# Patient Record
Sex: FEMALE | URBAN - METROPOLITAN AREA
[De-identification: names, ages, dates, MRNs, and addresses within clinical notes are randomized per-mention and may not be internally consistent; named-entity substitution may affect disease eponyms.]

---

## 2021-07-09 ENCOUNTER — APPOINTMENT (RX ONLY)
Dept: URBAN - METROPOLITAN AREA CLINIC 312 | Facility: CLINIC | Age: 66
Setting detail: DERMATOLOGY
End: 2021-07-09

## 2021-07-09 DIAGNOSIS — L74.51 PRIMARY FOCAL HYPERHIDROSIS: ICD-10-CM | Status: INADEQUATELY CONTROLLED

## 2021-07-09 DIAGNOSIS — L40.3 PUSTULOSIS PALMARIS ET PLANTARIS: ICD-10-CM | Status: INADEQUATELY CONTROLLED

## 2021-07-09 PROBLEM — L74.519 PRIMARY FOCAL HYPERHIDROSIS, UNSPECIFIED: Status: ACTIVE | Noted: 2021-07-09

## 2021-07-09 PROCEDURE — 99204 OFFICE O/P NEW MOD 45 MIN: CPT

## 2021-07-09 PROCEDURE — ? COUNSELING

## 2021-07-09 PROCEDURE — ? MEDICATION COUNSELING

## 2021-07-09 PROCEDURE — ? PRESCRIPTION

## 2021-07-09 PROCEDURE — ? PRESCRIPTION MEDICATION MANAGEMENT

## 2021-07-09 RX ORDER — GLYCOPYRROLATE 1 MG/1
TABLET ORAL
Qty: 30 | Refills: 11 | Status: ERX | COMMUNITY
Start: 2021-07-09

## 2021-07-09 RX ORDER — HALOBETASOL PROPIONATE 0.5 MG/G
OINTMENT TOPICAL
Qty: 1 | Refills: 1 | Status: ERX | COMMUNITY
Start: 2021-07-09

## 2021-07-09 RX ADMIN — GLYCOPYRROLATE: 1 TABLET ORAL at 00:00

## 2021-07-09 RX ADMIN — HALOBETASOL PROPIONATE: 0.5 OINTMENT TOPICAL at 00:00

## 2021-07-09 ASSESSMENT — LOCATION ZONE DERM
LOCATION ZONE: HAND
LOCATION ZONE: SCALP

## 2021-07-09 ASSESSMENT — LOCATION DETAILED DESCRIPTION DERM
LOCATION DETAILED: RIGHT THENAR EMINENCE
LOCATION DETAILED: LEFT THENAR EMINENCE
LOCATION DETAILED: LEFT MEDIAL FRONTAL SCALP

## 2021-07-09 ASSESSMENT — LOCATION SIMPLE DESCRIPTION DERM
LOCATION SIMPLE: RIGHT HAND
LOCATION SIMPLE: LEFT SCALP
LOCATION SIMPLE: LEFT HAND

## 2021-07-09 ASSESSMENT — BSA RASH: BSA RASH: 1

## 2021-07-09 NOTE — PROCEDURE: MEDICATION COUNSELING
Taltz Counseling: I discussed with the patient the risks of ixekizumab including but not limited to immunosuppression, serious infections, worsening of inflammatory bowel disease and drug reactions.  The patient understands that monitoring is required including a PPD at baseline and must alert us or the primary physician if symptoms of infection or other concerning signs are noted. GI bleed

## 2021-07-09 NOTE — PROCEDURE: MEDICATION COUNSELING
Xelzoranz Pregnancy And Lactation Text: This medication is Pregnancy Category D and is not considered safe during pregnancy.  The risk during breast feeding is also uncertain.

## 2021-07-09 NOTE — HPI: RASH (PSORIASIS)
How Severe Is Your Psoriasis?: moderate
Do You Have A Family History Of Psoriasis?: no
Is This A New Presentation, Or A Follow-Up?: Psoriasis
Additional History: New pt. Psoriasis on both palms of hands x10 years. Itchy and dry skin. Was taking Humira for 3 years. Stopped Jan 2016. Currently tx with OTC creams. Has used topical steroid 3 years ago, seen improvement. Cannot remember the name of the steroid.

## 2021-07-09 NOTE — PROCEDURE: COUNSELING
Medical Necessity Clause: Botulinum toxin hyperhidrosis therapy is medically necessary because it is having a significant impact on pt's quality of life.
Medical Necessity Information: LCD Guidelines vary from payer to payer. Please check with your payer's policy to determine medical necessity.
Detail Level: Zone
Patient Specific Counseling (Will Not Stick From Patient To Patient): Pt would like to avoid oral or biologic tx.

## 2021-07-09 NOTE — PROCEDURE: PRESCRIPTION MEDICATION MANAGEMENT
Initiate Treatment: Due to scalp location drysol is not an option.  Pt would like to try oral option and is aware of s/e as thoroughly discussed.  Has no hx of GI, , ocular, sinus, or other problems.
Render In Strict Bullet Format?: No
Detail Level: Zone

## 2021-08-20 ENCOUNTER — APPOINTMENT (RX ONLY)
Dept: URBAN - METROPOLITAN AREA CLINIC 312 | Facility: CLINIC | Age: 66
Setting detail: DERMATOLOGY
End: 2021-08-20

## 2021-08-20 DIAGNOSIS — L74.510 PRIMARY FOCAL HYPERHIDROSIS, AXILLA: ICD-10-CM | Status: WELL CONTROLLED

## 2021-08-20 DIAGNOSIS — L40.0 PSORIASIS VULGARIS: ICD-10-CM | Status: INADEQUATELY CONTROLLED

## 2021-08-20 PROCEDURE — ? TREATMENT REGIMEN

## 2021-08-20 PROCEDURE — ? PATIENT SPECIFIC COUNSELING

## 2021-08-20 PROCEDURE — ? PRESCRIPTION

## 2021-08-20 PROCEDURE — ? PRESCRIPTION MEDICATION MANAGEMENT

## 2021-08-20 PROCEDURE — 99214 OFFICE O/P EST MOD 30 MIN: CPT

## 2021-08-20 PROCEDURE — ? COUNSELING

## 2021-08-20 RX ORDER — CALCIPOTRIENE 50 UG/G
CREAM TOPICAL BID
Qty: 1 | Refills: 3 | Status: ERX | COMMUNITY
Start: 2021-08-20

## 2021-08-20 RX ADMIN — CALCIPOTRIENE: 50 CREAM TOPICAL at 00:00

## 2021-08-20 ASSESSMENT — LOCATION DETAILED DESCRIPTION DERM
LOCATION DETAILED: LEFT AXILLARY VAULT
LOCATION DETAILED: RIGHT AXILLARY VAULT
LOCATION DETAILED: LEFT THENAR EMINENCE
LOCATION DETAILED: RIGHT THENAR EMINENCE

## 2021-08-20 ASSESSMENT — LOCATION ZONE DERM
LOCATION ZONE: HAND
LOCATION ZONE: AXILLAE

## 2021-08-20 ASSESSMENT — LOCATION SIMPLE DESCRIPTION DERM
LOCATION SIMPLE: LEFT HAND
LOCATION SIMPLE: LEFT AXILLARY VAULT
LOCATION SIMPLE: RIGHT AXILLARY VAULT
LOCATION SIMPLE: RIGHT HAND

## 2021-08-20 NOTE — HPI: RASH (PSORIASIS)
How Severe Is Your Psoriasis?: moderate
Is This A New Presentation, Or A Follow-Up?: Follow Up Psoriasis
Additional History: Palmoplantar Psoriasis f/u. Last seen 7/9/21 Kim Pfeiffer. Prescribed Halobetasol Prop 0.05%. Pt states slight improvement. Has been applying cream bid [id number].

## 2021-08-20 NOTE — PROCEDURE: PRESCRIPTION MEDICATION MANAGEMENT
Plan: Diagnosis reviewed with the patient. Patient advised to consult with a rheumatologist for evaluation of any joint pain. Discussed common comorbidities of psoriasis including: elevated blood pressure, hyperlipidemia, obesity, diabetes, heart disease. Discussed importance of regular follow-up with a PCP. Discussed that psoriasis has no cure and treatment is aimed at control. Tx options include topicals (steriods, tazorac, calcipotriene, coal tar, etc), UV treatment, orals (MTX, retinoids, otezla), and biologics.  Pt had many questions regarding options.\\n\\nDisc Otezla.  Discussed potential s/e including headache, GI upset (change in appetite, wt loss, n/v, diarrhea, abd pain, etc), URI and depression.   Tb testing and laboratory testing is not nec with this medication.  \\n  \\nDisc biologics in regards to the medication itself, mechanism of action, treatment course, and potential side effects.  Pt aware of an increased risk of serious, chronic or recurring infections, injection site reactions, hematologic abnormalities, malignancy, lymphoma, reactivation of latent TB, serious sometimes fatal infections, exacerbation or new onset demyelinating disease (ie. MS, guillain barre), reactivation of Hepatitis B, hepatotoxicity, cytopenias, lupus-like symptoms.  Pt verbalizes understanding. All associated questions addressed.  \\n
Render In Strict Bullet Format?: No
Detail Level: Zone
Continue Regimen: Continue oral rx.  Pt is not experiencing s/e

## 2021-08-20 NOTE — PROCEDURE: PATIENT SPECIFIC COUNSELING
Pt will be leaving Sept 7th for a 9mo road trip.  She is hesitant to start OTEZLA or BIOLOGICS prior to leaving.  Will add DOVONOX CR BID to topical steroid.  Pt will f/u upon returning to discuss other options.
Detail Level: Zone

## 2021-08-20 NOTE — HPI: SWEATING (HYPERHIDROSIS)
How Severe Is It?: moderate
Is This A New Presentation, Or A Follow-Up?: Follow Up Hyperhidrosis
Additional History: Hyperhidrosis f/u. Last seen 7/9/21 Kim Pfeiffer. Prescribed Glycoperlate 1mg daily. Pt states improvement.

## 2021-08-20 NOTE — PROCEDURE: COUNSELING
Detail Level: Detailed
Medical Necessity Clause: Botulinum toxin hyperhidrosis therapy is medically necessary because it is having a significant impact on pt's quality of life.
Medical Necessity Information: LCD Guidelines vary from payer to payer. Please check with your payer's policy to determine medical necessity.
Detail Level: Zone

## 2023-08-12 NOTE — PROCEDURE: MEDICATION COUNSELING
Depressed Topical Clindamycin Counseling: Patient counseled that this medication may cause skin irritation or allergic reactions.  In the event of skin irritation, the patient was advised to reduce the amount of the drug applied or use it less frequently.   The patient verbalized understanding of the proper use and possible adverse effects of clindamycin.  All of the patient's questions and concerns were addressed.

## 2025-01-10 NOTE — PROCEDURE: MEDICATION COUNSELING
Fax: called to schedule the pts level 1 us. Unable to lvm. 1st attempt   Rhofade Counseling: Rhofade is a topical medication which can decrease superficial blood flow where applied. Side effects are uncommon and include stinging, redness and allergic reactions.